# Patient Record
Sex: MALE | Race: WHITE
[De-identification: names, ages, dates, MRNs, and addresses within clinical notes are randomized per-mention and may not be internally consistent; named-entity substitution may affect disease eponyms.]

---

## 2017-10-13 ENCOUNTER — HOSPITAL ENCOUNTER (EMERGENCY)
Dept: HOSPITAL 75 - ER | Age: 62
Discharge: HOME | End: 2017-10-13
Payer: MEDICAID

## 2017-10-13 VITALS — WEIGHT: 170 LBS | HEIGHT: 66 IN | BODY MASS INDEX: 27.32 KG/M2

## 2017-10-13 VITALS — SYSTOLIC BLOOD PRESSURE: 163 MMHG | DIASTOLIC BLOOD PRESSURE: 88 MMHG

## 2017-10-13 DIAGNOSIS — E86.0: ICD-10-CM

## 2017-10-13 DIAGNOSIS — I10: ICD-10-CM

## 2017-10-13 DIAGNOSIS — Z80.0: ICD-10-CM

## 2017-10-13 DIAGNOSIS — R11.10: ICD-10-CM

## 2017-10-13 DIAGNOSIS — Z87.820: ICD-10-CM

## 2017-10-13 DIAGNOSIS — Z90.89: ICD-10-CM

## 2017-10-13 DIAGNOSIS — K56.41: Primary | ICD-10-CM

## 2017-10-13 LAB
ALBUMIN SERPL-MCNC: 4.2 GM/DL (ref 3.2–4.5)
ALT SERPL-CCNC: 76 U/L (ref 0–55)
ANION GAP SERPL CALC-SCNC: 14 MMOL/L (ref 5–14)
ANISOCYTOSIS BLD QL SMEAR: SLIGHT
APTT BLD: 29 SEC (ref 24–35)
AST SERPL-CCNC: 48 U/L (ref 5–34)
BASOPHILS # BLD AUTO: 0.1 10^3/UL (ref 0–0.1)
BASOPHILS NFR BLD AUTO: 1 % (ref 0–10)
BASOPHILS NFR BLD MANUAL: 0 %
BILIRUB SERPL-MCNC: 0.6 MG/DL (ref 0.1–1)
BILIRUB UR QL STRIP: NEGATIVE
BUN SERPL-MCNC: 16 MG/DL (ref 7–18)
BUN/CREAT SERPL: 21
CALCIUM SERPL-MCNC: 9.7 MG/DL (ref 8.5–10.1)
CHLORIDE SERPL-SCNC: 101 MMOL/L (ref 98–107)
CO2 SERPL-SCNC: 20 MMOL/L (ref 21–32)
CREAT SERPL-MCNC: 0.76 MG/DL (ref 0.6–1.3)
EOSINOPHIL # BLD AUTO: 0.1 10^3/UL (ref 0–0.3)
EOSINOPHIL NFR BLD AUTO: 1 % (ref 0–10)
EOSINOPHIL NFR BLD MANUAL: 0 %
ERYTHROCYTE [DISTWIDTH] IN BLOOD BY AUTOMATED COUNT: 14.3 % (ref 10–14.5)
GFR SERPLBLD BASED ON 1.73 SQ M-ARVRAT: > 60 ML/MIN
GLUCOSE SERPL-MCNC: 123 MG/DL (ref 70–105)
INR PPP: 1 (ref 0.8–1.4)
KETONES UR QL STRIP: (no result)
LEUKOCYTE ESTERASE UR QL STRIP: NEGATIVE
LYMPHOCYTES # BLD AUTO: 2.2 X 10^3 (ref 1–4)
LYMPHOCYTES NFR BLD AUTO: 14 % (ref 12–44)
MCH RBC QN AUTO: 30 PG (ref 25–34)
MCHC RBC AUTO-ENTMCNC: 34 G/DL (ref 32–36)
MCV RBC AUTO: 90 FL (ref 80–99)
MONOCYTES # BLD AUTO: 1.4 X 10^3 (ref 0–1)
MONOCYTES NFR BLD AUTO: 9 % (ref 0–12)
NEUTROPHILS # BLD AUTO: 11.8 X 10^3 (ref 1.8–7.8)
NEUTROPHILS NFR BLD AUTO: 76 % (ref 42–75)
NEUTS BAND NFR BLD MANUAL: 74 %
NEUTS BAND NFR BLD: 0 %
NITRITE UR QL STRIP: NEGATIVE
PH UR STRIP: 6 [PH] (ref 5–9)
PLATELET # BLD: 383 10^3/UL (ref 130–400)
PMV BLD AUTO: 9.9 FL (ref 7.4–10.4)
POTASSIUM SERPL-SCNC: 4 MMOL/L (ref 3.6–5)
PROT SERPL-MCNC: 8.5 GM/DL (ref 6.4–8.2)
PROT UR QL STRIP: (no result)
PROTHROMBIN TIME: 13.5 SEC (ref 12.2–14.7)
RBC # BLD AUTO: 4.86 10^6/UL (ref 4.35–5.85)
SODIUM SERPL-SCNC: 135 MMOL/L (ref 135–145)
SP GR UR STRIP: 1.02 (ref 1.02–1.02)
SQUAMOUS #/AREA URNS HPF: (no result) /HPF
UROBILINOGEN UR-MCNC: NORMAL MG/DL
VARIANT LYMPHS NFR BLD MANUAL: 17 %
VARIANT LYMPHS NFR BLD MANUAL: 2 %
WBC # BLD AUTO: 15.6 10^3/UL (ref 4.3–11)
WBC #/AREA URNS HPF: (no result) /HPF

## 2017-10-13 PROCEDURE — 85027 COMPLETE CBC AUTOMATED: CPT

## 2017-10-13 PROCEDURE — 74176 CT ABD & PELVIS W/O CONTRAST: CPT

## 2017-10-13 PROCEDURE — 85610 PROTHROMBIN TIME: CPT

## 2017-10-13 PROCEDURE — 86920 COMPATIBILITY TEST SPIN: CPT

## 2017-10-13 PROCEDURE — 85730 THROMBOPLASTIN TIME PARTIAL: CPT

## 2017-10-13 PROCEDURE — 87040 BLOOD CULTURE FOR BACTERIA: CPT

## 2017-10-13 PROCEDURE — 71010: CPT

## 2017-10-13 PROCEDURE — 85007 BL SMEAR W/DIFF WBC COUNT: CPT

## 2017-10-13 PROCEDURE — 83605 ASSAY OF LACTIC ACID: CPT

## 2017-10-13 PROCEDURE — 36415 COLL VENOUS BLD VENIPUNCTURE: CPT

## 2017-10-13 PROCEDURE — 86900 BLOOD TYPING SEROLOGIC ABO: CPT

## 2017-10-13 PROCEDURE — 86901 BLOOD TYPING SEROLOGIC RH(D): CPT

## 2017-10-13 PROCEDURE — 80053 COMPREHEN METABOLIC PANEL: CPT

## 2017-10-13 PROCEDURE — 87804 INFLUENZA ASSAY W/OPTIC: CPT

## 2017-10-13 PROCEDURE — 81000 URINALYSIS NONAUTO W/SCOPE: CPT

## 2017-10-13 PROCEDURE — 94640 AIRWAY INHALATION TREATMENT: CPT

## 2017-10-13 PROCEDURE — 86850 RBC ANTIBODY SCREEN: CPT

## 2017-10-13 PROCEDURE — 86141 C-REACTIVE PROTEIN HS: CPT

## 2017-10-13 NOTE — ED GENERAL
General


Chief Complaint:  Fever-Adult/Adol


Stated Complaint:  UPPER GI BLEED,TEMP,VOMITING


Source of Information:  Patient, EMS, Nursing Home Records


Exam Limitations:  No Limitations


 (DAISY QUINTANA MD)





History of Present Illness


Time Seen by Provider:  03:21


Initial Comments


This 61-year-old resident of [facility] in [place], [place] presents to the 

emergency room via EMS with multiple episodes of vomiting this evening.  

Vomiting started around 19:00 at which time he vomited his supper.  Subsequent 

episodes of vomiting had a coffee-ground appearance.  Nursing home did not have 

vital signs at the time of report.  Patient has stated abdominal pain to 

nursing home staff.  Nursing home staff reported decreased level of alertness.  

Patient has developmental disabilities and does not talk much at baseline.  

Patient has no measurable fever but EMS, nursing staff, and this provider all 

agree he feels febrile.


 (DAISY QUINTANA MD)





Allergies and Home Medications


Allergies


Coded Allergies:  


     No Known Drug Allergies (Unverified , 13)





Home Medications


Metoprolol Succinate 25 Mg Tab.er.24h, 25 MG PO DAILY, #30 Ref 1


   Prescribed by: LEROY VALLEJO on 8/26/15 1111


Polyethylene Glycol 3350 17 Gm Powd.pack, 17 GM PO DAILY, #30


   Prescribed by: LEROY VALLEJO on 8/31/15 1318


Tizanidine HCl 2 Mg Capsule, 2 MG PO DAILY, (Reported)





Constitutional:  see HPI


EENTM:  no symptoms reported


Respiratory:  no symptoms reported


Cardiovascular:  no symptoms reported


Gastrointestinal:  see HPI


Genitourinary:  no symptoms reported


Musculoskeletal:  no symptoms reported


Skin:  no symptoms reported


Psychiatric/Neurological:  See HPI, Emotional Problems


Immunological/Allergic:  no symptoms reported (DAISY QUINTANA MD)





Past Medical-Social-Family Hx


Patient Social History


Recent Foreign Travel:  No


Contact w/Someone Who Travel:  No


 (DAISY QUINTANA MD)





Immunizations Up To Date


Tetanus Booster (TDap):  Less than 5yrs


PED Vaccines UTD:  Yes


Date of Pneumonia Vaccine:  2013


Date of Influenza Vaccine:  2013


 (DAISY QUINTANA MD)





Seasonal Allergies


Seasonal Allergies:  No


 (DAISY QUINTANA MD)





Surgeries


History of Surgeries:  Yes


Surgeries:  Abdominal (left inguinal hernia with testicle removal), 

Tonsillectomy


 (DAISY QUINTANA MD)





Respiratory


History of Respiratory Disorde:  No


Currently Using CPAP:  No


Currently Using BIPAP:  No


 (DAISY QUINTANA MD)





Cardiovascular


History of Cardiac Disorders:  Yes (venous insufficiency)


Cardiac Disorders:  Hypertension, Peripheral Vascular


 (DAISY QUINTANA MD)





Neurological


History of Neurological Disord:  Yes


Neurological Disorders:  Developmental Disorder, TIA, Traumatic Brain Injury


 (DAISY QUINTANA MD)





Reproductive System


Hx Reproductive Disorders:  No


Sexually Transmitted Disease:  No


HIV/AIDS:  No


 (DAISY QUINTANA MD)





Genitourinary


Genitourinary Disorders:  UTI-Chronic


 (DAISY QUINTANA MD)





Gastrointestinal


History of Gastrointestinal Di:  Yes


Gastrointestinal Disorders:  Obstructive Bowel


 (DAISY QUINTANA MD)





Musculoskeletal


History of Musculoskeletal Dis:  Yes


Musculoskeletal Disorders:  Osteoporosis, Arthritis, Contracture


 (DAISY QUINTANA MD)





Endocrine


History of Endocrine Disorders:  No


 (DAISY QUINTANA MD)





HEENT


History of HEENT Disorders:  No


 (DAISY QUINTANA MD)





Cancer


History of Cancer:  No


 (DAISY QUINTANA MD)





Psychosocial


History of Psychiatric Problem:  No


Behavioral Health Disorders:  Eating Disorder


 (DAISY QUINTANA MD)





Integumentary


History of Skin or Integumenta:  Yes


Skin/Integumentary Disorders:  Recent Skin Changes


 (DAISY QUINTANA MD)





Blood Transfusions


Adverse Reaction to a Blood Tr:  No


 (DAISY QUINTANA MD)





Family Medical History


Significant Family History:  No Pertinent Family Hx


Family Medial History:  


Cancer


  09 SISTER (BREAST CANCER)


  MATERAL GRANDMOTHER (BREAST CANCER)


  GREAT AUNT (BREAST CANCER)


  MATERNAL UNCLE  (PANCREATIC CANCER)


  MATERNAL GRANDFATHER (TESTICULAR/PROSTATE CANCER)


Family history: Cardiovascular disease


  09 SISTER


  09 SISTER (SMALL HEART ATTACK, SMALL STROKE )


Family history: Diabetes mellitus


  09 SISTER


  03 MOTHER


  09 SISTER


Family history: Hypertension


  09 SISTER


  03 FATHER


  09 SISTER


  09 SISTER


Family history: Thyroid disorder


  09 SISTER


History of - respiratory disease


  09 SISTER (COPD)


  09 SISTER (COPD/ASTHMA)


  09 SISTER (COPD /ASTHMA)


Myocardial infarction


Prostate cancer





No Family History of:


  Abdominal aortic aneurysm


  Oscoda's disease


  Alcoholism


  Aphasia


  Cancer of colon


  Cataract


  Chest pain


  Congenital heart disease


  Congestive heart failure


  Cystic fibrosis


  Dementia


  Dysphagia


  Family history: Allergy


  Family history: Alzheimer's disease


  Family history: Arthritis


  Family history: Asthma


  Family history: Breast disease


  Family history: Coronary thrombosis


  Family history: Gastrointestinal disease


  Family history: Glaucoma


  Family history: Osteoporosis


  Headache


  Hearing loss


  Heart disease


  Hereditary disease


  History of - anemia


  History of - disorder


  History of drug abuse


  Human immunodeficiency virus (HIV) seropositivity


  Hypercholesterolemia


  Infertile


  Kidney disease


  Malignant neoplasm of lung


  Parkinson's disease


  Psychotic disorder


  Seizure disorder


  Stroke


  Tuberculosis


  Visual impairment (DAISY QUINTANA MD)


Family Medial History:  


Cancer


  09 SISTER (BREAST CANCER)


  MATERAL GRANDMOTHER (BREAST CANCER)


  GREAT AUNT (BREAST CANCER)


  MATERNAL UNCLE  (PANCREATIC CANCER)


  MATERNAL GRANDFATHER (TESTICULAR/PROSTATE CANCER)


Family history: Cardiovascular disease


  09 SISTER


  09 SISTER (SMALL HEART ATTACK, SMALL STROKE )


Family history: Diabetes mellitus


  09 SISTER


  03 MOTHER


  09 SISTER


Family history: Hypertension


  09 SISTER


  03 FATHER


  09 SISTER


  09 SISTER


Family history: Thyroid disorder


  09 SISTER


History of - respiratory disease


  09 SISTER (COPD)


  09 SISTER (COPD/ASTHMA)


  09 SISTER (COPD /ASTHMA)


Myocardial infarction


Prostate cancer





No Family History of:


  Abdominal aortic aneurysm


  Oscoda's disease


  Alcoholism


  Aphasia


  Cancer of colon


  Cataract


  Chest pain


  Congenital heart disease


  Congestive heart failure


  Cystic fibrosis


  Dementia


  Dysphagia


  Family history: Allergy


  Family history: Alzheimer's disease


  Family history: Arthritis


  Family history: Asthma


  Family history: Breast disease


  Family history: Coronary thrombosis


  Family history: Gastrointestinal disease


  Family history: Glaucoma


  Family history: Osteoporosis


  Headache


  Hearing loss


  Heart disease


  Hereditary disease


  History of - anemia


  History of - disorder


  History of drug abuse


  Human immunodeficiency virus (HIV) seropositivity


  Hypercholesterolemia


  Infertile


  Kidney disease


  Malignant neoplasm of lung


  Parkinson's disease


  Psychotic disorder


  Seizure disorder


  Stroke


  Tuberculosis


  Visual impairment (BEBO CALLAHAN MD)





Physical Exam


Vital Signs





Vital Sign - Last 12Hours








 10/13/17 10/13/17





 03:25 06:06


 


Temp 99.4 


 


Pulse 126 


 


Resp 20 


 


B/P (MAP) 83/30 


 


Pulse Ox 94 


 


O2 Delivery  Room Air





 (BEBO CALLAHAN MD)


Vital Signs


Capillary Refill :  


 (DAISY QUINTANA MD)


General Appearance:  No Apparent Distress, WD/WN


HEENT:  PERRL/EOMI, Normal ENT Inspection


Neck:  Normal Inspection


Respiratory:  Lungs Clear, Normal Breath Sounds, No Accessory Muscle Use, No 

Respiratory Distress


Cardiovascular:  Regular Rate, Rhythm, No Edema, No Murmur


Gastrointestinal:  Normal Bowel Sounds, Non Tender, Soft


Extremity:  Other (contractures of the upper extremities.  Lower extremities in 

soft boots)


Neurologic/Psychiatric:  Alert, CNs II-XII Norm as Tested, Motor Weakness (

chronic and unchanged)


Skin:  Normal Color, Warm/Dry (DAISY QUINTANA MD)





Focused Exam


Evaluation


Lactate Level


Laboratory Tests


10/13/17 03:42: Lactic Acid Level 1.02


 (BEBO CALLAHAN MD)





Progress/Results/Core Measures


Results/Orders


Lab Results





Laboratory Tests








Test


  10/13/17


03:42 10/13/17


05:42 Range/Units


 


 


White Blood Count


  15.6 H


  


  4.3-11.0


10^3/uL


 


Red Blood Count


  4.86 


  


  4.35-5.85


10^6/uL


 


Hemoglobin 14.7   13.3-17.7  G/DL


 


Hematocrit 44   40-54  %


 


Mean Corpuscular Volume 90   80-99  FL


 


Mean Corpuscular Hemoglobin 30   25-34  PG


 


Mean Corpuscular Hemoglobin


Concent 34 


  


  32-36  G/DL


 


 


Red Cell Distribution Width 14.3   10.0-14.5  %


 


Platelet Count


  383 


  


  130-400


10^3/uL


 


Mean Platelet Volume 9.9   7.4-10.4  FL


 


Neutrophils (%) (Auto) 76 H  42-75  %


 


Lymphocytes (%) (Auto) 14   12-44  %


 


Monocytes (%) (Auto) 9   0-12  %


 


Eosinophils (%) (Auto) 1   0-10  %


 


Basophils (%) (Auto) 1   0-10  %


 


Neutrophils # (Auto) 11.8 H  1.8-7.8  X 10^3


 


Lymphocytes # (Auto) 2.2   1.0-4.0  X 10^3


 


Monocytes # (Auto) 1.4 H  0.0-1.0  X 10^3


 


Eosinophils # (Auto)


  0.1 


  


  0.0-0.3


10^3/uL


 


Basophils # (Auto)


  0.1 


  


  0.0-0.1


10^3/uL


 


Neutrophils % (Manual) 74    %


 


Lymphocytes % (Manual) 17    %


 


Monocytes % (Manual) 7    %


 


Eosinophils % (Manual) 0    %


 


Basophils % (Manual) 0    %


 


Band Neutrophils 0    %


 


Reactive Lymphocytes 2    %


 


Anisocytosis SLIGHT    


 


Prothrombin Time 13.5   12.2-14.7  SEC


 


INR Comment 1.0   0.8-1.4  


 


Activated Partial


Thromboplast Time 29 


  


  24-35  SEC


 


 


Sodium Level 135   135-145  MMOL/L


 


Potassium Level 4.0   3.6-5.0  MMOL/L


 


Chloride Level 101     MMOL/L


 


Carbon Dioxide Level 20 L  21-32  MMOL/L


 


Anion Gap 14   5-14  MMOL/L


 


Blood Urea Nitrogen 16   7-18  MG/DL


 


Creatinine


  0.76 


  


  0.60-1.30


MG/DL


 


Estimat Glomerular Filtration


Rate > 60 


  


   


 


 


BUN/Creatinine Ratio 21    


 


Glucose Level 123 H    MG/DL


 


Lactic Acid Level


  1.02 


  


  0.50-2.00


MMOL/L


 


Calcium Level 9.7   8.5-10.1  MG/DL


 


Total Bilirubin 0.6   0.1-1.0  MG/DL


 


Aspartate Amino Transf


(AST/SGOT) 48 H


  


  5-34  U/L


 


 


Alanine Aminotransferase


(ALT/SGPT) 76 H


  


  0-55  U/L


 


 


Alkaline Phosphatase 162 H    U/L


 


C-Reactive Protein High


Sensitivity 1.09 H


  


  0.00-0.50


MG/DL


 


Total Protein 8.5 H  6.4-8.2  GM/DL


 


Albumin 4.2   3.2-4.5  GM/DL


 


Urine Color  YELLOW   


 


Urine Clarity  CLEAR   


 


Urine pH  6  5-9  


 


Urine Specific Gravity  1.020  1.016-1.022  


 


Urine Protein  1+ H NEGATIVE  


 


Urine Glucose (UA)  NEGATIVE  NEGATIVE  


 


Urine Ketones  3+ H NEGATIVE  


 


Urine Nitrite  NEGATIVE  NEGATIVE  


 


Urine Bilirubin  NEGATIVE  NEGATIVE  


 


Urine Urobilinogen  NORMAL  NORMAL  MG/DL


 


Urine Leukocyte Esterase  NEGATIVE  NEGATIVE  


 


Urine RBC (Auto)  1+ H NEGATIVE  


 


Urine RBC  0-2   /HPF


 


Urine WBC  NONE   /HPF


 


Urine Squamous Epithelial


Cells 


  RARE 


   /HPF


 


 


Urine Crystals  NONE   /LPF


 


Urine Bacteria  NEGATIVE   /HPF


 


Urine Casts  NONE   /LPF


 


Urine Mucus  SMALL H  /LPF


 


Urine Culture Indicated  NO   





 (BEBO CALLAHAN MD)


Micro Results





Microbiology


10/13/17 Influenza Types A,B Antigen (FABIOLA) - Final, Complete


           


 (BEBO CALLAHAN MD)


My Orders





Orders - BEBO CALLAHAN MD


Lidocaine 2% (Urojet) (Xylocaine Urojet) (10/13/17 07:30)


Bisacodyl Suppository (Dulcolax Supposit (10/13/17 07:49)


 (BEBO CALLAHAN MD)


Medications Given in ED





Current Medications








 Medications  Dose


 Ordered  Sig/Andrea


 Route  Start Time


 Stop Time Status Last Admin


Dose Admin


 


 Albuterol/


 Ipratropium  3 ml  ONCE  ONCE


 INH  10/13/17 05:00


 10/13/17 05:01 DC 10/13/17 06:06


3 ML


 


 Famotidine  20 mg  ONCE  ONCE


 IVP  10/13/17 03:45


 10/13/17 03:46 DC 10/13/17 04:04


20 MG


 


 Lidocaine HCl  10 ml  ONCE  ONCE


 TOP  10/13/17 07:30


 10/13/17 07:31 DC 10/13/17 07:35


10 ML


 


 Ondansetron HCl  8 mg  ONCE  ONCE


 IVP  10/13/17 03:45


 10/13/17 03:46 DC 10/13/17 04:04


8 MG


 


 Pantoprazole  80 mg  ONCE  ONCE


 IV  10/13/17 03:45


 10/13/17 03:46 DC 10/13/17 04:09


80 MG


 


 Sodium Chloride  1,000 ml @ 


 0 mls/hr  Q0M ONCE


 IV  10/13/17 03:41


 10/13/17 03:44 DC 10/13/17 04:01


0 MLS/HR


 


 Sodium Chloride  1,000 ml @ 


 0 mls/hr  Q0M ONCE


 IV  10/13/17 06:10


 10/13/17 06:11 DC 10/13/17 06:56


0 MLS/HR





 (BEBO CALLAHAN MD)


Vital Signs/I&O





Vital Sign - Last 12Hours








 10/13/17 10/13/17





 03:25 06:06


 


Temp 99.4 


 


Pulse 126 


 


Resp 20 


 


B/P (MAP) 83/30 


 


Pulse Ox 94 96


 


O2 Delivery  Room Air





 (BEBO CALLAHAN MD)


Progress Note :  


   Time:  05:43


Progress Note


Heart rate and blood pressure have improved after a liter of normal saline.  

Patient received Pepcid, Zofran and Protonix.  There his been no further 

vomiting.  Patient denies pain.  Patient has leukocytosis but no source of 

infection has been identified.  UA was just obtained when Ugarte catheter was 

placed.  If UA is negative, CT of the abdomen will be performed.  Patient 

appeared to have some forced expirations.  A DuoNeb treatment has been ordered.


 (DAISY QUINTANA MD)


Progress Note :  


Progress Note


0630: Assumed care of patient pending CT scan of the abdomen and pelvis.  

Patient has hypertension now and not hypotension.  Also heart rate noted to be 

in the one teens.  Monitor patient.  0730: CT results noted.  Patient has fecal 

impaction which may be causing the hypertension and tachycardia due to pain.  

Patient does not admit to pain but has significant disability which may be 

causing the difficulty with communication.  Reexamination of the patient shows 

lung sounds clear with tachycardia.  Abdomen is nontender overall on exam 

although appears to be tight.  This would correlate with the moderate bowel gas 

noted on CT scan.  Patient does have soft foam boots to bilateral lower 

extremities.  I did discuss the case with on-call for [facility].  

Patient will need a digital rectal disimpaction and then bowel regimen at the 

nursing home.  No other significant findings to indicate the need for admission 

at this time.  Urojet instilled into the rectum to help with pain on digital 

rectal disimpaction.  Monitor patient.





0805: Digital rectal disimpaction and a small bowel stool.  Patient had 

multiple soft stool within the rectum which would indicate that patient is not 

fully impacted.  I Did place Dulcolax suppositories 2.  I did discuss the case 

with Dr. Dozier.  We will add MiraLAX 1 capful daily to his normal regimen.  

After rectal stimulation and placement of Dulcolax, patient did have a large 

bowel movement.  Discharged to nursing home with return precautions.  Dr. Dozier 

and staff will call nursing home for further orders as needed.


 (BEBO CALLAHAN MD)





Diagnostic Imaging





   Diagonstic Imaging:  Xray


   Plain Films/CT/US/NM/MRI:  chest


Comments


Chest x-ray viewed by me and compared with prior.  There are questionable 

increased perihilar markings on the right.  No other acute changes.


 (DAISY QUINTANA MD)





   Diagonstic Imaging:  CT


   Plain Films/CT/US/NM/MRI:  abdomen, pelvis


Comments


 V[facility].


 [place], [place]





NAME:   CAROLHANK DENTON


MED REC#:   [id number]


ACCOUNT#:   [id number]


PT STATUS:   REG ER


:   1955


PHYSICIAN:   DAISY QUINTANA MD


ADMIT DATE:   10/13/17/ER


 ***Draft***


Date of Exam:10/13/17





CT ABDOMEN/PELVIS WO








PROCEDURE: CT abdomen and pelvis without contrast.





TECHNIQUE: Multiple contiguous axial images were obtained through


the abdomen and pelvis without the use of intravenous contrast. 





INDICATION: Abdominal pain. 





COMPARISON: CT abdomen pelvis without contrast 2015.





FINDINGS: Examination limited by motion artifact. Mild


atelectasis or infiltrate in the lung bases. The liver,


gallbladder, pancreas, spleen, adrenals, kidneys, collecting


systems and appendix are unremarkable on this non-contrast exam.


Ugarte catheter. Marked distention of the rectum which is filled


with stool. The colon is otherwise moderately gaseously


distended. Right inguinal hernia containing a normal-appearing


loop of small bowel. Mild scattered atherosclerotic


calcifications including a normal caliber abdominal aorta. No


evidence of bowel obstruction. No free intraperitoneal air or


fluid. No lymphadenopathy. Moderate spondylotic changes in the


visualized spine. No acute osseous findings. Chronic left rib


fractures.





IMPRESSION: 


1. Distended rectum which is filled with stool suspicious for


fecal impaction. The colon is otherwise moderately gaseously


distended.


2. Persistent right inguinal hernia containing a normal-appearing


loop of small bowel.





  Dictated on workstation # [id number]








Dict:   10/13/17 0658


Trans:   10/13/17 0705


[facility] [id number]





Interpreted by:     JANNETH ALVAREZ MD


Electronically signed by:  


 (BEBO CALLAHAN MD)





Departure


Impression


Impression:  


 Primary Impression:  


 Fecal impaction in rectum


 Additional Impressions:  


 Vomiting


 Qualified Codes:  R11.14 - Bilious vomiting


 Dehydration


Disposition:  01 HOME, SELF-CARE


Condition:  Stable





Departure-Patient Inst.


Decision time for Depature:  08:07


 (BEBO CALLAHAN MD)


Referrals:  


HANK NOLASCO MD (PCP/Family)


Primary Care Physician


Patient Instructions:  Dehydration, Adult (DC), Fecal Impaction (DC), Nausea 

and Vomiting, Adult (DC)





Add. Discharge Instructions:  


All discharge instructions reviewed with patient and/or family. Voiced 

understanding.





Continue medications as directed.  Add MiraLAX 1 capful (17 g) by mouth daily 

in juice or water.  Follow-up with primary care physician within one week for 

recheck and further evaluation.  Return for worse pain, fever, vomiting, 

weakness, breathing problems or other concerns as needed.  Encourage plenty of 

fluids.











DAISY QUINTANA MD Oct 13, 2017 04:35


BEBO CALLAHAN MD Oct 13, 2017 07:49

## 2017-10-13 NOTE — DIAGNOSTIC IMAGING REPORT
PROCEDURE: CT abdomen and pelvis without contrast.



TECHNIQUE: Multiple contiguous axial images were obtained through

the abdomen and pelvis without the use of intravenous contrast. 



INDICATION: Abdominal pain. 



COMPARISON: CT abdomen pelvis without contrast 08/30/2015.



FINDINGS: Examination limited by motion artifact. Mild

atelectasis or infiltrate in the lung bases. The liver,

gallbladder, pancreas, spleen, adrenals, kidneys, collecting

systems and appendix are unremarkable on this non-contrast exam.

Ugarte catheter. Marked distention of the rectum which is filled

with stool. The colon is otherwise moderately gaseously

distended. Right inguinal hernia containing a normal-appearing

loop of small bowel. Mild scattered atherosclerotic

calcifications including a normal caliber abdominal aorta. No

evidence of bowel obstruction. No free intraperitoneal air or

fluid. No lymphadenopathy. Moderate spondylotic changes in the

visualized spine. No acute osseous findings. Chronic left rib

fractures.



IMPRESSION: 

1. Distended rectum which is filled with stool suspicious for

fecal impaction. The colon is otherwise moderately gaseously

distended.

2. Persistent right inguinal hernia containing a normal-appearing

loop of small bowel.



Dictated by: 



  Dictated on workstation # [id number]

## 2017-10-13 NOTE — DIAGNOSTIC IMAGING REPORT
EXAM: CHEST 1 VIEW, AP/PA ONLY



INDICATION: Fever.



COMPARISON: Chest radiograph 12/8/2013.



FINDINGS: Low lung volumes with perihilar atelectasis. Normal

heart size and pulmonary vascularity. No dense consolidation,

pleural effusion or pneumothorax. No acute osseous findings.



IMPRESSION: Low lung volumes with perihilar atelectasis. No dense

consolidation.



Dictated by: 



  Dictated on workstation # [id number]

## 2019-01-23 ENCOUNTER — HOSPITAL ENCOUNTER (OUTPATIENT)
Dept: HOSPITAL 75 - 4TH | Age: 64
Setting detail: OBSERVATION
LOS: 2 days | Discharge: ANOTHER HOSPITAL | End: 2019-01-25
Payer: MEDICAID

## 2019-02-02 ENCOUNTER — HOSPITAL ENCOUNTER (EMERGENCY)
Dept: HOSPITAL 75 - ER | Age: 64
Discharge: HOME | End: 2019-02-02
Payer: MEDICAID

## 2019-02-02 VITALS — DIASTOLIC BLOOD PRESSURE: 95 MMHG | SYSTOLIC BLOOD PRESSURE: 120 MMHG

## 2019-02-02 VITALS — BODY MASS INDEX: 34.57 KG/M2 | WEIGHT: 210 LBS | HEIGHT: 65.5 IN

## 2019-02-02 DIAGNOSIS — R04.0: Primary | ICD-10-CM

## 2019-02-02 DIAGNOSIS — Z80.0: ICD-10-CM

## 2019-02-02 DIAGNOSIS — Z87.820: ICD-10-CM

## 2019-02-02 DIAGNOSIS — Z90.89: ICD-10-CM

## 2019-02-02 DIAGNOSIS — Z79.51: ICD-10-CM

## 2019-02-02 DIAGNOSIS — Z86.73: ICD-10-CM

## 2019-02-02 DIAGNOSIS — Z87.440: ICD-10-CM

## 2019-02-02 DIAGNOSIS — I10: ICD-10-CM

## 2019-02-02 DIAGNOSIS — I73.9: ICD-10-CM

## 2019-02-02 DIAGNOSIS — Z82.49: ICD-10-CM

## 2019-02-02 DIAGNOSIS — M81.0: ICD-10-CM

## 2019-02-02 DIAGNOSIS — Z87.19: ICD-10-CM

## 2019-02-02 DIAGNOSIS — Z80.42: ICD-10-CM

## 2019-02-02 DIAGNOSIS — Z80.3: ICD-10-CM

## 2019-02-02 DIAGNOSIS — K58.9: ICD-10-CM

## 2019-02-02 LAB
ALBUMIN SERPL-MCNC: 4 GM/DL (ref 3.2–4.5)
ALP SERPL-CCNC: 144 U/L (ref 40–136)
ALT SERPL-CCNC: 23 U/L (ref 0–55)
APTT BLD: 28 SEC (ref 24–35)
BILIRUB SERPL-MCNC: 0.3 MG/DL (ref 0.1–1)
BUN/CREAT SERPL: 27
CALCIUM SERPL-MCNC: 9.1 MG/DL (ref 8.5–10.1)
CHLORIDE SERPL-SCNC: 103 MMOL/L (ref 98–107)
CO2 SERPL-SCNC: 21 MMOL/L (ref 21–32)
CREAT SERPL-MCNC: 0.7 MG/DL (ref 0.6–1.3)
ERYTHROCYTE [DISTWIDTH] IN BLOOD BY AUTOMATED COUNT: 13.8 % (ref 10–14.5)
GFR SERPLBLD BASED ON 1.73 SQ M-ARVRAT: > 60 ML/MIN
GLUCOSE SERPL-MCNC: 127 MG/DL (ref 70–105)
HCT VFR BLD CALC: 40 % (ref 40–54)
HGB BLD-MCNC: 13.3 G/DL (ref 13.3–17.7)
INR PPP: 1 (ref 0.8–1.4)
MCH RBC QN AUTO: 30 PG (ref 25–34)
MCHC RBC AUTO-ENTMCNC: 33 G/DL (ref 32–36)
MCV RBC AUTO: 91 FL (ref 80–99)
PLATELET # BLD: 361 10^3/UL (ref 130–400)
PMV BLD AUTO: 9.8 FL (ref 7.4–10.4)
POTASSIUM SERPL-SCNC: 4.2 MMOL/L (ref 3.6–5)
PROT SERPL-MCNC: 7.7 GM/DL (ref 6.4–8.2)
PROTHROMBIN TIME: 13.5 SEC (ref 12.2–14.7)
SODIUM SERPL-SCNC: 135 MMOL/L (ref 135–145)
WBC # BLD AUTO: 15 10^3/UL (ref 4.3–11)

## 2019-02-02 PROCEDURE — 36415 COLL VENOUS BLD VENIPUNCTURE: CPT

## 2019-02-02 PROCEDURE — 99284 EMERGENCY DEPT VISIT MOD MDM: CPT

## 2019-02-02 PROCEDURE — 85730 THROMBOPLASTIN TIME PARTIAL: CPT

## 2019-02-02 PROCEDURE — 85610 PROTHROMBIN TIME: CPT

## 2019-02-02 PROCEDURE — 85027 COMPLETE CBC AUTOMATED: CPT

## 2019-02-02 PROCEDURE — 80053 COMPREHEN METABOLIC PANEL: CPT

## 2019-02-02 NOTE — XMS REPORT
Continuity of Care Document

 Created on: 2019



HANK ETIENNE

External Reference #: [id number]

: 1955

Sex: Male



Demographics







 Address  [address]  [address]

 

 Home Phone  [phone] x

 

 Preferred Language  Unknown

 

 Marital Status  Unknown

 

 Confucianist Affiliation  Unknown

 

 Race  Unknown

 

 Ethnic Group  Unknown





Author







 Author  [facility]

 

 Address  Unknown

 

 Phone  Unavailable



              



Allergies

      





 Active            Description            Code            Type            
Severity            Reaction            Onset            Reported/Identified   
         Relationship to Patient            Clinical Status        

 

 Yes            No Known Drug Allergies            [id number]            Drug 
Allergy            Unknown            N/A                         2013   
                               



                  



Medications

      



There is no data.                  



Problems

      





 Date Dx Coded            Attending            Type            Code            
Diagnosis            Diagnosed By        

 

 2013            JONATHAN NORMAN MD            Ot            459.81     
       VENOUS INSUFFICIENCY NOS                     

 

 2013            JONATHAN NORMAN MD            Ot            682.6      
      CELLULITIS OF LEG                     

 

 2013            JONATHAN NORMAN MD            Ot            707.09     
       PRESSURE ULCER, OTHER SITE                     

 

 2013            JONATHAN NORMAN MD            Ot            707.23     
       PRESSURE ULCER, STAGE III                     

 

 2013            JONATHAN NORMAN MD            Ot            716.90     
       ARTHROPATHY NOS-UNSPEC                     

 

 2013            JONATHAN NORMAN MD            Ot            733.00     
       OSTEOPOROSIS NOS                     

 

 2013            JONATHAN NORMAN MD            Ot            V04.81     
       ND FOR PROPHYLACTIC VACCIN AND INOCULATI                     

 

 2013            JONATHAN NORMAN MD            Ot            V12.54     
       PERSONAL HX OF TIA,  CEREBRAL INFARCTION                     

 

 2013            EDWARD JESUS DO                         315.9          
  LEARNING/DELAY IN DEVELOPMENT                     

 

 2013            EDWARD JESUS DO                         401.1          
  HYPERTENSION, BENIGN ESSENTIAL                     

 

 2013            EDWARD JESUS DO                         682.6          
  CELLULITIS AND ABSCESS OF LEG EXCEPT FOOT                     

 

 2013            EDWARD JESUS DO                         315.9          
  LEARNING/DELAY IN DEVELOPMENT                     

 

 2013            EDWARD JESUS DO                         401.1          
  HYPERTENSION, BENIGN ESSENTIAL                     

 

 2013            EDWARD JESUS DO                         682.6          
  CELLULITIS AND ABSCESS OF LEG EXCEPT FOOT                     

 

 2013            JANEL GASPAR                         315.9    
        LEARNING/DELAY IN DEVELOPMENT                     

 

 2013            JANEL GASPAR                         401.1    
        HYPERTENSION, BENIGN ESSENTIAL                     

 

 2013            CLARK APRN, JANEL R                         682.6    
        CELLULITIS AND ABSCESS OF LEG EXCEPT FOOT                     

 

 2013            JESUS DO EDWARD K                         315.9          
  LEARNING/DELAY IN DEVELOPMENT                     

 

 2013            JESUS DO EDWARD K                         401.1          
  HYPERTENSION, BENIGN ESSENTIAL                     

 

 2013            JESUS DO, EDWARD K                         682.6          
  CELLULITIS AND ABSCESS OF LEG EXCEPT FOOT                     

 

 2013            JESUS DO, EDWARD K                         315.9          
  LEARNING/DELAY IN DEVELOPMENT                     

 

 2013            JESUS DO EDWARD K                         401.1          
  HYPERTENSION, BENIGN ESSENTIAL                     

 

 2013            JESUS DO EDWARD K                         682.6          
  CELLULITIS AND ABSCESS OF LEG EXCEPT FOOT                     

 

 2013            JANEL GASPAR R                         315.9    
        LEARNING/DELAY IN DEVELOPMENT                     

 

 2013            JANEL GASPAR R                         401.1    
        HYPERTENSION, BENIGN ESSENTIAL                     

 

 2013            JANEL GASPAR                         682.6    
        CELLULITIS AND ABSCESS OF LEG EXCEPT FOOT                     

 

 2013            ANN MARIE ARELLANO EDWARD K            Ot            041.19        
    BACTERIAL INFECTION DUE TO OTHER STAPHYL                     

 

 2013            MINISTERIO JESUS DOA K            Ot            041.49        
    OTHER AND UNSPECIFIED ESCHERICHIA COLI [                     

 

 2013            ANN MARIE ARELLANO EDWARD K            Ot            112.3         
   CUTANEOUS CANDIDIASIS                     

 

 2013            ANN MARIE ARELLANO EDWARD K            Ot            276.1         
   HYPOSMOLALITY                     

 

 2013            ANN MARIE ARELLANO EDWARD K            Ot            315.2         
   OTH LEARNING DIFFICULTY                     

 

 2013            ANN MARIE ARELLANO EDWARD K            Ot            401.9         
   HYPERTENSION NOS                     

 

 2013            ANN MARIE ARELLANO EDWARD K            Ot            438.89        
    OTH LATE EFFECT-CEREBROVASCULAR DISEASE                     

 

 2013            ANN MARIE ARELLANO EDWARD K            Ot            599.0         
   URIN TRACT INFECTION NOS                     

 

 2013            MINISTERIO JESUS DOA K            Ot            682.6         
   CELLULITIS OF LEG                     

 

 2013            ANN MARIE ARELLANO EDWARD K            Ot            728.87        
    MUSCLE WEAKNESS (GENERALIZED)                     

 

 2013            ANN MARIE ARELLANO EDWARD K            Ot            780.79        
    OTH MALAISE FATIGUE                     

 

 2013            ANN MARIE ARELLANO EDWARD K            Ot            908.9         
   LATE EFFECT INJURY NOS                     

 

 2013            ANN MARIE ARELLANO EDWARD K            Ot            E929.9        
    LATE EFF ACCIDENT NOS                     

 

 2013            ANN MARIE ARELLANO EDWARD K            Ot            V15.88        
    HISTORY OF FALL                     

 

 2013            ANN MARIE ARELLANO EDWARD K                         276.51         
   DEHYDRATION                     

 

 2013            ANN MARIE ARELLANO EDWARD K                         599.0          
  URINARY TRACT INFECTION                     

 

 2013            AMBER SOLISJANEL TUCKER R                         276.51   
         DEHYDRATION                     

 

 2013            CLARK ELIZABETH, JANEL R                         599.0    
        URINARY TRACT INFECTION                     

 

 2013            JESUS DO, EDWARD K                         276.51         
   DEHYDRATION                     

 

 2013            JESUS DO, EDWARD K                         599.0          
  URINARY TRACT INFECTION                     

 

 2013            JESUS DO, EDWARD K                         276.51         
   DEHYDRATION                     

 

 2013            JESUS DO, EDWARD K                         599.0          
  URINARY TRACT INFECTION                     

 

 2013            AMBER SOLISJANEL TUCKER R                         276.51   
         DEHYDRATION                     

 

 2013            AMBER JOHNSON, JANEL R                         599.0    
        URINARY TRACT INFECTION                     

 

 2014            AMBER SOLISJANEL TUCKER R                         596.51   
         OVERACTIVE BLADDER                     

 

 2014            JESUS DO, EDWARD K                         596.51         
   OVERACTIVE BLADDER                     

 

 2014            JESUS DO, EDWARD K                         596.51         
   OVERACTIVE BLADDER                     

 

 2014            AMBER SOLISJANEL TUCKER R                         596.51   
         OVERACTIVE BLADDER                     

 

 2014            EMERSON AYERS, JONATHAN SELF            Ot            682.6      
      CELLULITIS OF LEG                     

 

 2014            EMERSON AYERS, JONATHAN SELF            Ot            891.1      
      OPEN WND KNEE/LEG-COMPL                     

 

 2014            JONATHAN NORMAN MD            Ot            E000.8     
       OTHER EXTERNAL CAUSE STATUS                     

 

 2014            EMERSON AYERS, JONATHAN SELF            Ot            E928.9     
       ACCIDENT NOS                     

 

 2014            ANN MARIE ARELLANOEDWARD K                         599.70         
   HEMATURIA                     

 

 2014            ANN MARIE ARELLANOMINISTERIOA K                         599.70         
   HEMATURIA                     

 

 2014            AMBER SOLISJANEL TUCKER R                         599.70   
         HEMATURIA                     

 

 2015            JANEL CLARK CFALEXI            Ot            780.97  
                                

 

 2015            JANEL CLARK CFALEXI            Ot            780.97  
                                

 

 2015            GAURAV AYERS, HANK WATERS            Ot            261        
                          

 

 2015            GAURAV AYERS, HANK WATERS            Ot            276.51     
                             

 

 2015            GAURAV AYERS, HANK WATERS            Ot            401.9      
                            

 

 2015            GAURAV AYERS, HANK WATERS            Ot            707.01     
                             

 

 2015            GAURAV AYERS, HANK WATERS            Ot            707.03     
                             

 

 2015            GAURAV AYERS, HANK WATERS            Ot            707.09     
                             

 

 2015            GAURAV AYERS, HANK F            Ot            707.25     
                             

 

 2015            GAURAV AYERS, HANK F            Ot            780.97     
                             

 

 2015            GAURAV AYERS, HANK F            Ot            799.3      
                            

 

 2015            GAURAV AYERS, HANK F            Ot            799.52     
                             

 

 2015            GAURAV AYERS, HANK F            Ot            V49.86     
                             

 

 2015            GAURAV AYERS, HANK F            Ot            261        
                          

 

 2015            GAURAV AYERS, HANK F            Ot            276.51     
                             

 

 2015            GAURAV AYERS, HANK F            Ot            401.9      
                            

 

 2015            GAURAV AYERS, HANK F            Ot            707.01     
                             

 

 2015            GAURAV AYERS, HANK F            Ot            707.03     
                             

 

 2015            GAURAV AYERS, HANK F            Ot            707.09     
                             

 

 2015            GAURAV AYERS, HANK F            Ot            707.25     
                             

 

 2015            GAURAV AYERS, HANK F            Ot            780.97     
                             

 

 2015            GAURAV AYERS, HANK F            Ot            799.3      
                            

 

 2015            GAURAV AYERS, HANK F            Ot            799.52     
                             

 

 2015            GAURAV AYERS, HANK F            Ot            V49.86     
                             

 

 2015            GAURAV AYERS, HANK F            Ot            261        
                          

 

 2015            GAURAV AYERS, HANK F            Ot            276.51     
                             

 

 2015            GAURAV AYERS, HANK F            Ot            401.9      
                            

 

 2015            GAURAV AYERS, HANK F            Ot            707.01     
                             

 

 2015            GAURAV AYERS, HANK F            Ot            707.03     
                             

 

 2015            GAURAV AYERS, HANK F            Ot            707.09     
                             

 

 2015            GAURAV AYERS, HANK F            Ot            707.25     
                             

 

 2015            GAURAV AYERS, HANK F            Ot            780.97     
                             

 

 2015            GAURAV AYERS, HANK F            Ot            799.3      
                            

 

 2015            GAURAV AYERS, HANK F            Ot            799.52     
                             

 

 2015            GAURAV AYERS, HANK F            Ot            V49.86     
                             

 

 2015            GAURAV AYERS, HANK F            Ot            261        
    NUTRITIONAL MARASMUS                     

 

 2015            GAURAV AYERS, HANK F            Ot            276.1      
      HYPOSMOLALITY                     

 

 2015            GAURAV AYERS, HANK F            Ot            276.51     
                             

 

 2015            GAURAV AYERS, HANK F            Ot            276.8      
      HYPOPOTASSEMIA                     

 

 2015            GAURAV AYERS, HANK WATERS            Ot            317        
    MILD INTELLECTUAL DISABILITIES                     

 

 2015            GAURAV AYERS, HANK WATERS            Ot            401.9      
      HYPERTENSION NOS                     

 

 2015            GAURAV AYERS, HANK WATERS            Ot            707.01     
       PRESSURE ULCER, ELBOW                     

 

 2015            GAURAV AYERS, HANK WATERS            Ot            707.03     
       PRESSURE ULCER, LOWER BACK                     

 

 2015            GAURAV AYERS, HANK WATERS            Ot            707.09     
       PRESSURE ULCER, OTHER SITE                     

 

 2015            GAURAV AYERS, HANK WATERS            Ot            707.25     
       PRESSURE ULCER, UNSTAGEABLE                     

 

 2015            GAURAV AYERS, HANK WATERS            Ot            780.97     
       ALTERED MENTAL STATUS                     

 

 2015            GAURAV AYERS, HANK WATERS            Ot            799.3      
      DEBILITY NOS                     

 

 2015            GAURAV AYERS, HANK WATERS            Ot            799.52     
       COGNITIVE COMMUNICATION DEFICIT                     

 

 2015            GAURAV AYERS, HANK WATERS            Ot            V49.86     
       DO NOT RESUSCITATE STATUS                     

 

 2015            KALANI AYERS, JANEL AHN            Ot            682.6      
                            

 

 2015            KALANI AYERS, JANEL AHN            Ot            891.1      
                            

 

 2015            KALANI AYERS, JANEL AHN            Ot            E000.8     
                             

 

 2015            KALANI AYERS, JANEL AHN            Ot            E928.9     
                             

 

 2015            GAURAV AYERS, HANK WATERS            Ot            276.51     
       DEHYDRATION                     

 

 2015            GAURAV AYERS, HANK WATERS            Ot            276.7      
      HYPERPOTASSEMIA                     

 

 2015            GAURAV AYERS, HANK WATERS            Ot            317        
    MILD INTELLECTUAL DISABILITIES                     

 

 2015            GAURAV AYERS, HANK WATERS            Ot            331.5      
      IDIOPATHIC NORMAL PRESSURE HYDROCEPHALUS                     

 

 2015            GAURAV AYERS, HANK WATERS            Ot            401.9      
      HYPERTENSION NOS                     

 

 2015            GAURAV AYERS, HANK WATERS            Ot            518.0      
      PULMONARY COLLAPSE                     

 

 2015            GAURAV AYERS, HANK WATERS            Ot            550.90     
       UNILAT INGUINAL HERNIA                     

 

 2015            GAURAV AYERS, HANK WATERS            Ot            560.32     
       FECAL IMPACTION                     

 

 2015            GAURAV AYERS, HANK WATERS            Ot            707.01     
       PRESSURE ULCER, ELBOW                     

 

 2015            GAURAV AYERS, HANK WATERS            Ot            707.03     
       PRESSURE ULCER, LOWER BACK                     

 

 2015            GAURAV AYERS, HANK WATERS            Ot            707.23     
       PRESSURE ULCER, STAGE III                     

 

 2015            HANK NOLASCO MD            Ot            718.40     
       JT CONTRACTURE-UNSPEC                     

 

 2015            HANK NOLASCO MD            Ot            790.4      
      ELEV TRANSAMINASE/LDH                     

 

 2015            HANK NOLASCO MD            Ot            V49.86     
       DO NOT RESUSCITATE STATUS                     

 

 2015            JANEL URIARTE MD            Ot            682.6      
                            

 

 2015            JANEL URIARTE MD            Ot            891.1      
                            

 

 2015            JANEL URIARTE MD            Ot            E000.8     
                             

 

 2015            JANEL URIARTE MD            Ot            E928.9     
                             

 

 2015            AMBER JANEL FINCH            Ot            780.97  
                                

 

 2015            JANEL URIARTE MD            Ot            707.01     
       PRESSURE ULCER, ELBOW                     

 

 2015            KALANI AYERS, JANEL AHN            Ot            707.03     
       PRESSURE ULCER, LOWER BACK                     

 

 2015            JANEL URIARTE MD            Ot            707.23     
       PRESSURE ULCER, STAGE III                     

 

 2015            JANEL URIARTE MD            Ot            707.24     
       PRESSURE ULCER, STAGE IV                     

 

 2015            KALANI AYERS, JANEL AHN            Ot            799.3      
      DEBILITY NOS                     

 

 10/14/2015            KALANI AYERS, JANEL AHN            Ot            707.01     
                             

 

 10/14/2015            KALANI AYERS, JANEL AHN            Ot            707.03     
                             

 

 10/14/2015            KALANI AYERS, JANEL AHN            Ot            707.23     
                             

 

 10/14/2015            KALANI AYERS, JANEL AHN            Ot            707.24     
                             

 

 10/14/2015            KALANI AYERS, JANEL AHN            Ot            799.3      
                            

 

 10/15/2015            KALANI AYERS, JANEL AHN            Ot            707.01     
                             

 

 10/15/2015            KALANI AYERS, JANEL AHN            Ot            707.03     
                             

 

 10/15/2015            KALANI AYERS, JANEL AHN            Ot            707.23     
                             

 

 10/15/2015            KALANI AYERS, JANEL AHN            Ot            707.24     
                             

 

 10/15/2015            JANEL URIARTE MD            Ot            799.3      
                            

 

 2015            KALANI AYERS, JANEL AHN            Ot            L89.024    
        PRESSURE ULCER OF LEFT ELBOW, STAGE 4                     

 

 2015            KALANI AYERS, JANEL AHN            Ot            L89.153    
        PRESSURE ULCER OF SACRAL REGION, STAGE 3                     

 

 2015            KALANI AYERS, JANEL AHN            Ot            R54        
    AGE-RELATED PHYSICAL DEBILITY                     

 

 2016            GEOVANNI AYERS, RAÚL MAHARAJ            Ot            F03.90      
      UNSPECIFIED DEMENTIA WITHOUT BEHAVIORAL                      

 

 2016            RAÚL GALARZA MD            Ot            I10         
   ESSENTIAL (PRIMARY) HYPERTENSION                     

 

 2016            RAÚL GALARZA MD            Ot            R11.10      
      VOMITING, UNSPECIFIED                     

 

 2016            RAÚL GALARZA MD            Ot            Z87.820     
       PERSONAL HISTORY OF TRAUMATIC BRAIN INJU                     

 

 10/13/2017            BEBO CALLAHAN MD            Ot            E86.0   
         DEHYDRATION                     

 

 10/13/2017            BEBO CALLAHAN MD            Ot            I10     
       ESSENTIAL (PRIMARY) HYPERTENSION                     

 

 10/13/2017            BEBO CALLAHAN MD            Ot            K56.41  
          FECAL IMPACTION                     

 

 10/13/2017            BEBO CALLAHAN MD            Ot            R11.10  
          VOMITING, UNSPECIFIED                     

 

 10/13/2017            BEBO CALLAHAN MD            Ot            Z80.0   
         FAMILY HISTORY OF MALIGNANT NEOPLASM OF                      

 

 10/13/2017            BEBO CALLAHAN MD            Ot            Z87.820 
           PERSONAL HISTORY OF TRAUMATIC BRAIN INJU                     

 

 10/13/2017            BEBO CALLAHAN MD            Ot            Z90.89  
          ACQUIRED ABSENCE OF OTHER ORGANS                     

 

 10/16/2017            BEBO CALLAHAN MD            Ot            E86.0   
         DEHYDRATION                     

 

 10/16/2017            BEBO CALLAHAN MD, Ot            I10     
       ESSENTIAL (PRIMARY) HYPERTENSION                     

 

 10/16/2017            BEBO CALLAHAN MD            Ot            K56.41  
          FECAL IMPACTION                     

 

 10/16/2017            BEBO CALLAHAN MD, Ot            R11.10  
          VOMITING, UNSPECIFIED                     

 

 10/16/2017            BEBO CALLAHAN MD            Ot            Z80.0   
         FAMILY HISTORY OF MALIGNANT NEOPLASM OF                      

 

 10/16/2017            BEBO CALLAHAN MD            Ot            Z87.820 
           PERSONAL HISTORY OF TRAUMATIC BRAIN INJU                     

 

 10/16/2017            BEBO CALLAHAN MD            Ot            Z90.89  
          ACQUIRED ABSENCE OF OTHER ORGANS                     

 

 2019            JANEL CLARK CFALEXI            Ot            780.97  
          ALTERED MENTAL STATUS                     

 

 2019            HARINI LEPE MD            Ot            D72.829      
      ELEVATED WHITE BLOOD CELL COUNT, UNSPECI                     

 

 2019            HARINI LEPE MD            Ot            F81.9        
    DEVELOPMENTAL DISORDER OF SCHOLASTIC SKI                     

 

 2019            HARINI LEPE MD            Ot            I10          
  ESSENTIAL (PRIMARY) HYPERTENSION                     

 

 2019            HARINI LEPE MD            Ot            K56.41       
     FECAL IMPACTION                     

 

 2019            HARINI LEPE MD            Ot            K56.7        
    ILEUS, UNSPECIFIED                     

 

 2019            HARINI LEPE MD, Ot            M24.50       
     CONTRACTURE, UNSPECIFIED JOINT                     

 

 2019            HARINI LEPE MD, Ot            M81.0        
    AGE-RELATED OSTEOPOROSIS W/O CURRENT PAT                     

 

 2019            HARINI LEPE MD, Ot            R11.2        
    NAUSEA WITH VOMITING, UNSPECIFIED                     

 

 2019            HARINI LEPE MD, Ot            Z66          
  DO NOT RESUSCITATE                     

 

 2019            HARINI LEPE MD, Ot            Z86.73       
     PRSNL HX OF TIA (TIA), AND CEREB INFRC W                     

 

 2019            HARINI LEPE MD, Ot            Z87.820      
      PERSONAL HISTORY OF TRAUMATIC [facility]                     



                                                                               
                                                                               
                                                                               
                                                                               
                                                              



Procedures

      





 Code            Description            Performed By            Performed On   
     

 

             [id number]                                  [facility] LONG DIP                
                   2013        

 

             [id number]                                  UA LONG DIP                
                   2014        

 

             [id number]                                  UA W/MICROSCOPY            
                       2014        

 

             [id number]                                  UA LONG DIP                
                   2014        

 

             [id number]                                  UA W/MICROSCOPY            
                       2014        



                          



Results

      





 Test            Result            Range        









 Complete blood count (CBC) with automated white blood cell (WBC) differential 
- 10/13/17 03:42         









 Blood leukocytes automated count (number/volume)            15.6 10*3/uL      
      4.3-11.0        

 

 Blood erythrocytes automated count (number/volume)            4.86 10*6/uL    
        4.35-5.85        

 

 Venous blood hemoglobin measurement (mass/volume)            14.7 g/dL        
    13.3-17.7        

 

 Blood hematocrit (volume fraction)            44 %            40-54        

 

 Automated erythrocyte mean corpuscular volume            90 [foz_us]          
  80-99        

 

 Automated erythrocyte mean corpuscular hemoglobin (mass per erythrocyte)      
      30 pg            25-34        

 

 Automated erythrocyte mean corpuscular hemoglobin concentration measurement (
mass/volume)            34 g/dL            32-36        

 

 Automated erythrocyte distribution width ratio            14.3 %            
10.0-14.5        

 

 Automated blood platelet count (count/volume)            383 10*3/uL          
  130-400        

 

 Automated blood platelet mean volume measurement            9.9 [foz_us]      
      7.4-10.4        

 

 Automated blood neutrophils/100 leukocytes            76 %            42-75   
     

 

 Automated blood lymphocytes/100 leukocytes            14 %            12-44   
     

 

 Blood monocytes/100 leukocytes            9 %            0-12        

 

 Automated blood eosinophils/100 leukocytes            1 %            0-10     
   

 

 Automated blood basophils/100 leukocytes            1 %            0-10        

 

 Blood neutrophils automated count (number/volume)            11.8 10*3        
    1.8-7.8        

 

 Blood lymphocytes automated count (number/volume)            2.2 10*3         
   1.0-4.0        

 

 Blood monocytes automated count (number/volume)            1.4 10*3            
0.0-1.0        

 

 Automated eosinophil count            0.1 10*3/uL            0.0-0.3        

 

 Automated blood basophil count (count/volume)            0.1 10*3/uL          
  0.0-0.1        









 PT panel in platelet poor plasma by coagulation assay - 10/13/17 03:42         









 Prothrombin time (PT) in platelet poor plasma by coagulation assay            
13.5 s            12.2-14.7        

 

 INR in platelet poor plasma or blood by coagulation assay            1.0      
       0.8-1.4        









 Activated partial thromboplastin time (aPTT) in platelet poor plasma 
bycoagulation assay - 10/13/17 03:42         









 Activated partial thromboplastin time (aPTT) in platelet poor plasma 
bycoagulation assay            29 s            24-35        









 Blood lactic acid measurement (moles/volume) - 10/13/17 03:42         









 Blood lactic acid measurement (moles/volume)            1.02 mmol/L            
0.50-2.00        









 Blood manual differential performed detection - 10/13/17 03:42         









 Blood monocytes/100 leukocytes            7 %            NRG        

 

 Manual blood segmented neutrophils/100 leukocytes            74 %            
NRG        

 

 Blood band neutrophils/100 leukocytes            0 %            NRG        

 

 Manual blood lymphocytes/100 leukocytes            17 %            NRG        

 

 Manual eosinophils/100 leukocytes in nose            0 %            NRG        

 

 Manual blood basophils/100 leukocytes            0 %            NRG        

 

 Blood lymphocytes variant/100 leukocytes            2 %            NRG        

 

 Blood anisocytosis detection by light microscopy            SLIGHT             
NR        









 Comprehensive metabolic panel - 10/13/17 03:42         









 Serum or plasma sodium measurement (moles/volume)            135 mmol/L       
     135-145        

 

 Serum or plasma potassium measurement (moles/volume)            4.0 mmol/L    
        3.6-5.0        

 

 Serum or plasma chloride measurement (moles/volume)            101 mmol/L     
               

 

 Carbon dioxide            20 mmol/L            21-32        

 

 Serum or plasma anion gap determination (moles/volume)            14 mmol/L   
         5-14        

 

 Serum or plasma urea nitrogen measurement (mass/volume)            16 mg/dL   
         7-18        

 

 Serum or plasma creatinine measurement (mass/volume)            0.76 mg/dL    
        0.60-1.30        

 

 Serum or plasma urea nitrogen/creatinine mass ratio            21             
NRG        

 

 Serum or plasma creatinine measurement with calculation of estimated 
glomerular filtration rate            >             NRG        

 

 Serum or plasma glucose measurement (mass/volume)            123 mg/dL        
            

 

 Serum or plasma calcium measurement (mass/volume)            9.7 mg/dL        
    8.5-10.1        

 

 Serum or plasma total bilirubin measurement (mass/volume)            0.6 mg/dL
            0.1-1.0        

 

 Serum or plasma alkaline phosphatase measurement (enzymatic activity/volume)  
          162 U/L                    

 

 Serum or plasma aspartate aminotransferase measurement (enzymatic activity/
volume)            48 U/L            5-34        

 

 Serum or plasma alanine aminotransferase measurement (enzymatic activity/volume
)            76 U/L            0-55        

 

 Serum or plasma protein measurement (mass/volume)            8.5 g/dL         
   6.4-8.2        

 

 Serum or plasma albumin measurement (mass/volume)            4.2 g/dL         
   3.2-4.5        









 Blood type T Indirect antibody screen panel - 10/13/17 03:42         









 ABO+Rh group            OP             NRG        

 

 Transfusion band number            [id number]             NRG        

 

 Blood group antibody screen            NEGATIVE             NRG        









 Serum or plasma C reactive protein measurement (mass/volume) - 10/13/17 03:42 
        









 Serum or plasma C reactive protein measurement (mass/volume)            1.09 mg
/dL            0.00-0.50        









 Influenza virus A and B antigen detection - 10/13/17 03:54         









 FLU RESULT            NEGATIVE FOR INFLUENZA A AND B ANTIGENS BY IA           
  [facility]        









 Bacterial blood culture - 10/13/17 04:50         









 QUANTITY OF GROWTH            .             NRG        

 

 Bacterial blood culture            SEE COMMEN             NRG        









 Bacterial blood culture - 10/13/17 05:00         









 QUANTITY OF GROWTH            Isolated             NRG        

 

 Bacterial blood culture            [id number]             NRG        









 Complete urinalysis with reflex to culture - 10/13/17 05:42         









 Urine color determination            YELLOW             NRG        

 

 Urine clarity determination            CLEAR             NRG        

 

 Urine pH measurement by test strip            6             5-9        

 

 Specific gravity of urine by test strip            1.020             1.016-
1.022        

 

 Urine protein assay by test strip, semi-quantitative            1+             
NEGATIVE        

 

 Urine glucose detection by automated test strip            NEGATIVE           
  NEGATIVE        

 

 Erythrocytes detection in urine sediment by light microscopy            1+    
         NEGATIVE        

 

 Urine ketones detection by automated test strip            3+             
NEGATIVE        

 

 Urine nitrite detection by test strip            NEGATIVE             NEGATIVE
        

 

 Urine total bilirubin detection by test strip            NEGATIVE             
NEGATIVE        

 

 Urine urobilinogen measurement by automated test strip (mass/volume)          
  NORMAL             NORMAL        

 

 Urine leukocyte esterase detection by dipstick            NEGATIVE             
NEGATIVE        

 

 Automated urine sediment erythrocyte count by microscopy (number/high power 
field)             [HPF]            [facility]        

 

 Automated urine sediment leukocyte count by microscopy (number/high power field
)            NONE             NRG        

 

 Bacteria detection in urine sediment by light microscopy            NEGATIVE  
           NRG        

 

 Squamous epithelial cells detection in urine sediment by light microscopy     
       RARE             NRG        

 

 Crystals detection in urine sediment by light microscopy            NONE      
       NRG        

 

 Casts detection in urine sediment by light microscopy            NONE         
    NRG        

 

 Mucus detection in urine sediment by light microscopy            SMALL        
     NRG        

 

 Complete urinalysis with reflex to culture            NO             NRG      
  









 Complete urinalysis with reflex to culture - 19 18:40         









 Urine color determination            YELLOW             NRG        

 

 Urine clarity determination            CLEAR             NRG        

 

 Urine pH measurement by test strip            6             5-9        

 

 Specific gravity of urine by test strip            1.020             1.016-
1.022        

 

 Urine protein assay by test strip, semi-quantitative            2+             
NEGATIVE        

 

 Urine glucose detection by automated test strip            NEGATIVE           
  NEGATIVE        

 

 Erythrocytes detection in urine sediment by light microscopy            
NEGATIVE             NEGATIVE        

 

 Urine ketones detection by automated test strip            2+             
NEGATIVE        

 

 Urine nitrite detection by test strip            NEGATIVE             NEGATIVE
        

 

 Urine total bilirubin detection by test strip            NEGATIVE             
NEGATIVE        

 

 Urine urobilinogen measurement by automated test strip (mass/volume)          
  NORMAL             NORMAL        

 

 Urine leukocyte esterase detection by dipstick            1+             
NEGATIVE        

 

 Automated urine sediment erythrocyte count by microscopy (number/high power 
field)             [HPF]            NRG        

 

 Automated urine sediment leukocyte count by microscopy (number/high power field
)             [HPF]            NRG        

 

 Bacteria detection in urine sediment by light microscopy            NEGATIVE  
           NRG        

 

 Crystals detection in urine sediment by light microscopy            NONE      
       NRG        

 

 Casts detection in urine sediment by light microscopy            NONE         
    NRG        

 

 Mucus detection in urine sediment by light microscopy            LARGE        
     NRG        

 

 Complete urinalysis with reflex to culture            NO             NRG      
  









 Complete blood count (CBC) with automated white blood cell (WBC) differential 
- 19 18:52         









 Blood leukocytes automated count (number/volume)            20.1 10*3/uL      
      4.3-11.0        

 

 Blood erythrocytes automated count (number/volume)            4.82 10*6/uL    
        4.35-5.85        

 

 Venous blood hemoglobin measurement (mass/volume)            14.7 g/dL        
    13.3-17.7        

 

 Blood hematocrit (volume fraction)            43 %            40-54        

 

 Automated erythrocyte mean corpuscular volume            90 [foz_us]          
  80-99        

 

 Automated erythrocyte mean corpuscular hemoglobin (mass per erythrocyte)      
      31 pg            25-34        

 

 Automated erythrocyte mean corpuscular hemoglobin concentration measurement (
mass/volume)            34 g/dL            32-36        

 

 Automated erythrocyte distribution width ratio            13.5 %            
10.0-14.5        

 

 Automated blood platelet count (count/volume)            378 10*3/uL          
  130-400        

 

 Automated blood platelet mean volume measurement            10.3 [foz_us]     
       7.4-10.4        

 

 Automated blood neutrophils/100 leukocytes            83 %            42-75   
     

 

 Automated blood lymphocytes/100 leukocytes            9 %            12-44    
    

 

 Blood monocytes/100 leukocytes            8 %            0-12        

 

 Automated blood eosinophils/100 leukocytes            0 %            0-10     
   

 

 Automated blood basophils/100 leukocytes            0 %            0-10        

 

 Blood neutrophils automated count (number/volume)            16.6 10*3        
    1.8-7.8        

 

 Blood lymphocytes automated count (number/volume)            1.9 10*3         
   1.0-4.0        

 

 Blood monocytes automated count (number/volume)            1.6 10*3            
0.0-1.0        

 

 Automated eosinophil count            0.1 10*3/uL            0.0-0.3        

 

 Automated blood basophil count (count/volume)            0.1 10*3/uL          
  0.0-0.1        









 Comprehensive metabolic panel - 19 18:52         









 Serum or plasma sodium measurement (moles/volume)            137 mmol/L       
     135-145        

 

 Serum or plasma potassium measurement (moles/volume)            3.7 mmol/L    
        3.6-5.0        

 

 Serum or plasma chloride measurement (moles/volume)            99 mmol/L      
              

 

 Carbon dioxide            24 mmol/L            21-32        

 

 Serum or plasma anion gap determination (moles/volume)            14 mmol/L   
         5-14        

 

 Serum or plasma urea nitrogen measurement (mass/volume)            16 mg/dL   
         7-18        

 

 Serum or plasma creatinine measurement (mass/volume)            0.73 mg/dL    
        0.60-1.30        

 

 Serum or plasma urea nitrogen/creatinine mass ratio            22             
NRG        

 

 Serum or plasma creatinine measurement with calculation of estimated 
glomerular filtration rate            >             NRG        

 

 Serum or plasma glucose measurement (mass/volume)            121 mg/dL        
            

 

 Serum or plasma calcium measurement (mass/volume)            9.4 mg/dL        
    8.5-10.1        

 

 Serum or plasma total bilirubin measurement (mass/volume)            0.6 mg/dL
            0.1-1.0        

 

 Serum or plasma alkaline phosphatase measurement (enzymatic activity/volume)  
          159 U/L                    

 

 Serum or plasma aspartate aminotransferase measurement (enzymatic activity/
volume)            27 U/L            5-34        

 

 Serum or plasma alanine aminotransferase measurement (enzymatic activity/volume
)            21 U/L            0-55        

 

 Serum or plasma protein measurement (mass/volume)            8.2 g/dL         
   6.4-8.2        

 

 Serum or plasma albumin measurement (mass/volume)            4.0 g/dL         
   3.2-4.5        

 

 CALCIUM CORRECTED            9.4 mg/dL            8.5-10.1        









 Serum or plasma amylase measurement (enzymatic activity/volume) - 19 18:
52         









 Serum or plasma amylase measurement (enzymatic activity/volume)            39 U
/L                    









 Lipase - 19 18:52         









 Lipase            < U/L            8-78        









 Blood manual differential performed detection - 19 18:52         









 Blood monocytes/100 leukocytes            2 %            NRG        

 

 Manual blood segmented neutrophils/100 leukocytes            82 %            
NRG        

 

 Blood band neutrophils/100 leukocytes            0 %            NRG        

 

 Manual blood lymphocytes/100 leukocytes            15 %            NRG        

 

 Manual eosinophils/100 leukocytes in nose            0 %            NRG        

 

 Manual blood basophils/100 leukocytes            1 %            NRG        

 

 Blood erythrocyte morphology finding identification            NORMAL         
    NRG        









 Bacterial blood culture - 19 19:30         









 Bacterial blood culture            NG             NRG        









 Blood lactic acid measurement (moles/volume) - 19 19:50         









 Blood lactic acid measurement (moles/volume)            1.97 mmol/L            
0.50-2.00        









 Bacterial blood culture - 19 19:50         









 Bacterial blood culture            NG             NRG        









 Complete blood count (CBC) with automated white blood cell (WBC) differential 
- 19 04:31         









 Blood leukocytes automated count (number/volume)            15.1 10*3/uL      
      4.3-11.0        

 

 Blood erythrocytes automated count (number/volume)            4.41 10*6/uL    
        4.35-5.85        

 

 Venous blood hemoglobin measurement (mass/volume)            13.4 g/dL        
    13.3-17.7        

 

 Blood hematocrit (volume fraction)            40 %            40-54        

 

 Automated erythrocyte mean corpuscular volume            90 [foz_us]          
  80-99        

 

 Automated erythrocyte mean corpuscular hemoglobin (mass per erythrocyte)      
      30 pg            25-34        

 

 Automated erythrocyte mean corpuscular hemoglobin concentration measurement (
mass/volume)            34 g/dL            32-36        

 

 Automated erythrocyte distribution width ratio            13.7 %            
10.0-14.5        

 

 Automated blood platelet count (count/volume)            353 10*3/uL          
  130-400        

 

 Automated blood platelet mean volume measurement            10.1 [foz_us]     
       7.4-10.4        

 

 Automated blood neutrophils/100 leukocytes            69 %            42-75   
     

 

 Automated blood lymphocytes/100 leukocytes            21 %            12-44   
     

 

 Blood monocytes/100 leukocytes            8 %            0-12        

 

 Automated blood eosinophils/100 leukocytes            1 %            0-10     
   

 

 Automated blood basophils/100 leukocytes            0 %            0-10        

 

 Blood neutrophils automated count (number/volume)            10.5 10*3        
    1.8-7.8        

 

 Blood lymphocytes automated count (number/volume)            3.2 10*3         
   1.0-4.0        

 

 Blood monocytes automated count (number/volume)            1.3 10*3            
0.0-1.0        

 

 Automated eosinophil count            0.2 10*3/uL            0.0-0.3        

 

 Automated blood basophil count (count/volume)            0.1 10*3/uL          
  0.0-0.1        









 Comprehensive metabolic panel - 19 04:31         









 Serum or plasma sodium measurement (moles/volume)            135 mmol/L       
     135-145        

 

 Serum or plasma potassium measurement (moles/volume)            3.9 mmol/L    
        3.6-5.0        

 

 Serum or plasma chloride measurement (moles/volume)            100 mmol/L     
               

 

 Carbon dioxide            26 mmol/L            21-32        

 

 Serum or plasma anion gap determination (moles/volume)            9 mmol/L    
        5-14        

 

 Serum or plasma urea nitrogen measurement (mass/volume)            15 mg/dL   
         7-18        

 

 Serum or plasma creatinine measurement (mass/volume)            0.76 mg/dL    
        0.60-1.30        

 

 Serum or plasma urea nitrogen/creatinine mass ratio            20             
NRG        

 

 Serum or plasma creatinine measurement with calculation of estimated 
glomerular filtration rate            >             NRG        

 

 Serum or plasma glucose measurement (mass/volume)            127 mg/dL        
            

 

 Serum or plasma calcium measurement (mass/volume)            8.7 mg/dL        
    8.5-10.1        

 

 Serum or plasma total bilirubin measurement (mass/volume)            0.5 mg/dL
            0.1-1.0        

 

 Serum or plasma alkaline phosphatase measurement (enzymatic activity/volume)  
          131 U/L                    

 

 Serum or plasma aspartate aminotransferase measurement (enzymatic activity/
volume)            21 U/L            5-34        

 

 Serum or plasma alanine aminotransferase measurement (enzymatic activity/volume
)            19 U/L            0-55        

 

 Serum or plasma protein measurement (mass/volume)            6.9 g/dL         
   6.4-8.2        

 

 Serum or plasma albumin measurement (mass/volume)            3.5 g/dL         
   3.2-4.5        

 

 CALCIUM CORRECTED            9.1 mg/dL            8.5-10.1        









 Complete blood count (CBC) with automated white blood cell (WBC) differential 
- 19 04:08         









 Blood leukocytes automated count (number/volume)            9.9 10*3/uL       
     4.3-11.0        

 

 Blood erythrocytes automated count (number/volume)            4.20 10*6/uL    
        4.35-5.85        

 

 Venous blood hemoglobin measurement (mass/volume)            12.5 g/dL        
    13.3-17.7        

 

 Blood hematocrit (volume fraction)            38 %            40-54        

 

 Automated erythrocyte mean corpuscular volume            91 [foz_us]          
  80-99        

 

 Automated erythrocyte mean corpuscular hemoglobin (mass per erythrocyte)      
      30 pg            25-34        

 

 Automated erythrocyte mean corpuscular hemoglobin concentration measurement (
mass/volume)            33 g/dL            32-36        

 

 Automated erythrocyte distribution width ratio            13.8 %            
10.0-14.5        

 

 Automated blood platelet count (count/volume)            312 10*3/uL          
  130-400        

 

 Automated blood platelet mean volume measurement            10.2 [foz_us]     
       7.4-10.4        

 

 Automated blood neutrophils/100 leukocytes            53 %            42-75   
     

 

 Automated blood lymphocytes/100 leukocytes            31 %            12-44   
     

 

 Blood monocytes/100 leukocytes            10 %            0-12        

 

 Automated blood eosinophils/100 leukocytes            5 %            0-10     
   

 

 Automated blood basophils/100 leukocytes            1 %            0-10        

 

 Blood neutrophils automated count (number/volume)            5.2 10*3         
   1.8-7.8        

 

 Blood lymphocytes automated count (number/volume)            3.0 10*3         
   1.0-4.0        

 

 Blood monocytes automated count (number/volume)            1.0 10*3            
0.0-1.0        

 

 Automated eosinophil count            0.5 10*3/uL            0.0-0.3        

 

 Automated blood basophil count (count/volume)            0.1 10*3/uL          
  0.0-0.1        









 Whole blood basic metabolic panel - 19 04:08         









 Serum or plasma sodium measurement (moles/volume)            132 mmol/L       
     135-145        

 

 Serum or plasma potassium measurement (moles/volume)            4.1 mmol/L    
        3.6-5.0        

 

 Serum or plasma chloride measurement (moles/volume)            100 mmol/L     
               

 

 Carbon dioxide            24 mmol/L            21-32        

 

 Serum or plasma anion gap determination (moles/volume)            8 mmol/L    
        5-14        

 

 Serum or plasma urea nitrogen measurement (mass/volume)            11 mg/dL   
         7-18        

 

 Serum or plasma creatinine measurement (mass/volume)            0.67 mg/dL    
        0.60-1.30        

 

 Serum or plasma urea nitrogen/creatinine mass ratio            16             
NRG        

 

 Serum or plasma creatinine measurement with calculation of estimated 
glomerular filtration rate            >             NRG        

 

 Serum or plasma glucose measurement (mass/volume)            96 mg/dL         
           

 

 Serum or plasma calcium measurement (mass/volume)            8.5 mg/dL        
    8.5-10.1        



                                                                



Encounters

      





 ACCT No.            Visit Date/Time            Discharge            Status    
        Pt. Type            Provider            Facility            Loc./Unit  
          Complaint        

 

 [id number]            2014 11:01:00            2014 23:59:59          
  CLS            Outpatient            JANEL GASPAR                  
                             

 

 [id number]            2014 09:37:00            2014 23:59:59          
  CLS            Outpatient            EDWARD JESUS DO                        
                       

 

 [id number]            2014 10:21:00            2014 23:59:59          
  CLS            Outpatient            EDWARD JESUS DO                        
                       

 

 [id number]            2013 13:34:00            2013 23:59:59          
  CLS            Outpatient            JANEL GASPAR                  
                             

 

 [id number]            2013 13:34:00            2013 23:59:59          
  CLS            Outpatient            EDWARD JESUS DO                        
                       

 

 [id number]            2013 13:26:00            2013 23:59:59          
  CLS            Outpatient            EDWARD JESUS DO                        
                       

 

 [id number]            2019 20:00:00            2019 14:05:00    
        DIS            Outpatient            ROBERTH AYERS, HARINI ALSTON            [address] 
[facility]            4TH            ILEUS,PSEUO-OBSTRUCTION,
MENTAL IMPAIRMENT        

 

 [id number]            10/13/2017 03:21:00            10/13/2017 08:55:00    
        DIS            Emergency            BEBO CALLAHAN MD            
[facility]            ER            UPPER GI BLEED,TEMP,
VOMITING        

 

 [id number]            2016 06:07:00            2016 08:30:00    
        DIS            Emergency            RAÚL GALARZA MD            [address] 
[facility]            ER            VOMITING        

 

 [id number]            2016 22:44:00            2016 02:05:00    
        DIS            Emergency            RAÚL GALARZA MD            [address] 
[facility]            ER            SHAKING        

 

 [id number]            2015 09:33:00            2015 13:52:00    
        DIS            Outpatient            JANEL URIARTE MD            [address] 
[facility]            WOUNDCARE            CELLULITIS RT 
LOWER LEG        

 

 [id number]            2015 14:39:00            2015 00:01:00    
        DIS            Outpatient            JANEL URIARTE MD            [facility] 
[facility]            WOUNDCARE            CELLULITIS RT 
LOWER LEG        

 

 [id number]            2015 18:30:00            2015 15:40:00    
        DIS            Inpatient            HANK NOLASCO MD            [address] 
[address]            BIBASILAR PNEUMONIA 
COLONIC OBSTRCUTION IMPACTION        

 

 [id number]            2015 09:31:00            2015 14:10:00    
        DIS            Inpatient            HANK NOLASCO MD            [address] 
[address]            AMS,DEBILITY        

 

 [id number]            2015 07:12:00            2015 23:59:59    
        CLS            Outpatient            JANEL CLARK CFNP            
[facility]            RAD            MENTAL STATUS 
DECREASED        

 

 [id number]            2014 12:00:00            2014 16:21:00    
        DIS            Outpatient            JONATHAN NORMAN MD            [address] 
[facility]            WOUNDCARE            CELLULITIS RT 
LOWER LEG        

 

 [id number]            2013 23:22:00            2013 12:25:00    
        DIS            Inpatient            EDWARD JESUS DO            [address] 
[address]            UTI;DEHYDRATION;
CELLULITIS AND WOUND INFECTION        

 

 [id number]            2013 21:15:00            2013 15:30:00    
        DIS            Inpatient            JONATHAN NORMAN MD            [address] 
[address]            CELLULITIS,AMS        

 

 [id number]            2015 14:39:55                         [facility]            
Document Registration

## 2019-02-02 NOTE — NUR
[facility]. THEY 
WILL BE CONTACTING THE  AND SENDING THEM THIS WAY AS SOON AS 
POSSIBLE.

## 2019-02-02 NOTE — ED EENT
History of Present Illness


General


Chief Complaint:  Nasal Problems


Stated Complaint:  NOSE BLEED


Source:  EMS, nursing home records


Exam Limitations:  physical impairment (advance inability)


 (BEBO CALLAHAN MD)





History of Present Illness


Date Seen by Provider:  Feb 2, 2019


Time Seen by Provider:  17:08


Initial Comments


Here by EMS from nursing home where he is reported to have nosebleed since 330.

  They have tried Afrin nasal spray and packing and cannot get it stopped.  He 

was reported to have normal blood pressure today but does have history of high 

blood pressure.  EMS had difficulty getting blood pressure due to 4 extremity 

contracture.  On arrival here, he was noted to be hypertensive in the 170s to 

190s over 120s to 130s.  Patient is not actively bleeding currently after 

packing is removed.  No obvious bleeding overall.  Nursing home reported that 

he apparently was get nosebleed under control but then would sneeze and a large 

clot, and it would start all over again.


Timing/Duration:  abrupt, this afternoon


Severity:  moderate


Location:  nose


Prearrival Treatment:  squeezing nostrils, nasal packing


Modifying Factors:  Improves With Rest


Associated Symptoms:  No fever (BEBO CALLAHAN MD)





Allergies and Home Medications


Allergies


Coded Allergies:  


     No Known Drug Allergies (Unverified , 12/9/13)





Home Medications


Acetaminophen 325 Mg Tablet, 650 MG PO Q4H PRN for PAIN-MILD OR TEMPATURE, (

Reported)


Albuterol Sulfate 2.5 Mg/3 Ml Vial.neb, 2.5 MG NEB Q4H PRN for SHORTNESS OF 

BREATH, (Reported)


Guaifenesin 100 Mg/5 Ml Liquid, 5 ML PO Q4H PRN for COUGH/CONGESTION, (Reported)


Ketotifen Fumarate 5 Ml Drops, 1 DROP OU BID, (Reported)


Latanoprost 2.5 Ml Drops, 1 DROP OU HS, (Reported)


Loratadine 10 Mg Tablet, 10 MG PO DAILY PRN for ALLERGIES, (Reported)


Magnesium Hydroxide 400 Mg/5 Ml Oral.susp, 30 ML PO DAILY PRN for CONSTIPATION-

7TH LINE, (Reported)


Metoprolol Tartrate 50 Mg Tablet, 50 MG PO BID, (Reported)


   HOLD IF P<45 OR B/P <80/60 CALL DRYohana IF GREATER THAN 190/100 


Multivits,Stress Formula/Zinc 1 Each Tablet, 1 TAB PO DAILY, (Reported)


Nystatin 15 Gm Powder, TP Q12H PRN for REDNESS/GAULDING, (Reported)


Polyethylene Glycol 3350 17 Gm Powd.pack, 17 GM PO DAILY, (Reported)


Ranitidine HCl 150 Mg Tablet, 150 MG PO BID, (Reported)


Tetrahydrozoline HCl 15 Ml Drops, 2 DROPS OD Q8H PRN for ALLERGIES, (Reported)


Tizanidine HCl 2 Mg Tablet, 2 MG PO DAILY, (Reported)


[Xeroform Pad]  , TOP HS, (Reported)


   APPLY TO RIGHT SHIN 





Patient Home Medication List


Home Medication List Reviewed:  Yes


 (BEBO CALLAHAN MD)





Review of Systems


Review of Systems


Constitutional:  see HPI; No chills, No fever


Nose:  see HPI, clots, epistaxis


Other


Unable to complete review of systems due to her underlying developmental 

disorder and inability to communicate.


 (BEBO CALLAHAN MD)





Past Medical-Social-Family Hx


Past Med/Social Hx:  Reviewed Nursing Past Med/Soc Hx


 (BEBO CALLAHAN MD)





Immunizations Up To Date


Tetanus Booster (TDap):  Less than 5yrs


PED Vaccines UTD:  Yes


Date of Pneumonia Vaccine:  Jul 20, 2013


Date of Influenza Vaccine:  Oct 9, 2017


 (BEBO CALLAHAN MD)





Seasonal Allergies


Seasonal Allergies:  No


 (BEBO CALLAHAN MD)





Past Medical History


Surgeries:  Yes


Abdominal, Tonsillectomy


Respiratory:  No


Currently Using CPAP:  No


Currently Using BIPAP:  No


Cardiac:  Yes (venous insufficiency)


Hypertension, Peripheral Vascular


Neurological:  Yes


Developmental Disorder, TIA, Traumatic Brain Injury


Reproductive Disorders:  No


Sexually Transmitted Disease:  No


HIV/AIDS:  No


Genitourinary:  Yes


UTI-Chronic


Gastrointestinal:  Yes (ILEUS; IMPACTIONS)


Obstructive Bowel, Chronic Constipation


Musculoskeletal:  Yes (? NON-AMBULATORY??; CONTRACTURES OF ARMS AND HANDS)


Osteoporosis, Arthritis, Contracture


Endocrine:  No


HEENT:  No


Cancer:  No


Psychosocial:  Yes (DEVELOPMENTAL DISORDER; COGNITIVE IMPAIRMENT)


Eating Disorder


Integumentary:  Yes


Recent Skin Changes


Blood Disorders:  No


Adverse Reaction/Blood Tranf:  No


 (BEBO CALLAHAN MD)





Family Medical History





Cancer


  09 SISTER (BREAST CANCER)


  MATERAL GRANDMOTHER (BREAST CANCER)


  GREAT AUNT (BREAST CANCER)


  MATERNAL UNCLE  (PANCREATIC CANCER)


  MATERNAL GRANDFATHER (TESTICULAR/PROSTATE CANCER)


Family history: Cardiovascular disease


  09 SISTER


  09 SISTER (SMALL HEART ATTACK, SMALL STROKE )


Family history: Diabetes mellitus


  09 SISTER


  03 MOTHER


  09 SISTER


Family history: Hypertension


  09 SISTER


  03 FATHER


  09 SISTER


  09 SISTER


Family history: Thyroid disorder


  09 SISTER


History of - respiratory disease


  09 SISTER (COPD)


  09 SISTER (COPD/ASTHMA)


  09 SISTER (COPD /ASTHMA)


Myocardial infarction


Prostate cancer





No Family History of:


  Abdominal aortic aneurysm


  Rom's disease


  Alcoholism


  Aphasia


  Cancer of colon


  Cataract


  Chest pain


  Congenital heart disease


  Congestive heart failure


  Cystic fibrosis


  Dementia


  Dysphagia


  Family history: Allergy


  Family history: Alzheimer's disease


  Family history: Arthritis


  Family history: Asthma


  Family history: Breast disease


  Family history: Coronary thrombosis


  Family history: Gastrointestinal disease


  Family history: Glaucoma


  Family history: Osteoporosis


  Headache


  Hearing loss


  Heart disease


  Hereditary disease


  History of - anemia


  History of - disorder


  History of drug abuse


  Human immunodeficiency virus (HIV) seropositivity


  Hypercholesterolemia


  Infertile


  Kidney disease


  Malignant neoplasm of lung


  Parkinson's disease


  Psychotic disorder


  Seizure disorder


  Stroke


  Tuberculosis


  Visual impairment





History per records review as patient is unable to provide information.


 (BEBO CALLAHAN MD)





Physical Exam


Vital Signs





Vital Signs - First Documented








 2/2/19





 17:21


 


Temp 96.2


 


Pulse 93


 


Resp 14


 


B/P (MAP) 193/107 (135)


 


Pulse Ox 97





 (JEFFERY RIVAS)


Height, Weight, BMI


Height: 5'4.00"


Weight: 193lbs. 12.8oz. 87.469681uj; 33.2 BMI


Method:Estimated


General Appearance:  WD/WN, no apparent distress, other (contracture 4 

extremities)


Eyes:  bilateral eye normal inspection, bilateral eye PERRL, bilateral eye EOMI


Nose:  dried blood, other (old blood in the left near the right naris appears 

clear.)


Mouth/Throat:  pharynx normal, other (no obvious blood in the posterior pharynx)


Cardiovascular:  no murmur, tachycardia


Respiratory:  lungs clear, normal breath sounds


Gastrointestinal:  non tender, soft


Neurologic/Psychiatric:  other (awake and follows simple commands by opening 

mouth and verbalizing AH)


Skin:  normal color, warm/dry (BEBO CALLAHAN MD)





Progress/Results/Core Measures


Results/Orders


Lab Results





Laboratory Tests








Test


 2/2/19


17:35 Range/Units


 


 


White Blood Count


 15.0 H


 4.3-11.0


10^3/uL


 


Red Blood Count


 4.41 


 4.35-5.85


10^6/uL


 


Hemoglobin 13.3  13.3-17.7  G/DL


 


Hematocrit 40  40-54  %


 


Mean Corpuscular Volume 91  80-99  FL


 


Mean Corpuscular Hemoglobin 30  25-34  PG


 


Mean Corpuscular Hemoglobin


Concent 33 


 32-36  G/DL





 


Red Cell Distribution Width 13.8  10.0-14.5  %


 


Platelet Count


 361 


 130-400


10^3/uL


 


Mean Platelet Volume 9.8  7.4-10.4  FL


 


Prothrombin Time 13.5  12.2-14.7  SEC


 


INR Comment 1.0  0.8-1.4  


 


Activated Partial


Thromboplast Time 28 


 24-35  SEC





 


Sodium Level 135  135-145  MMOL/L


 


Potassium Level 4.2  3.6-5.0  MMOL/L


 


Chloride Level 103    MMOL/L


 


Carbon Dioxide Level 21  21-32  MMOL/L


 


Anion Gap 11  5-14  MMOL/L


 


Blood Urea Nitrogen 19 H 7-18  MG/DL


 


Creatinine


 0.70 


 0.60-1.30


MG/DL


 


Estimat Glomerular Filtration


Rate > 60 


  





 


BUN/Creatinine Ratio 27   


 


Glucose Level 127 H   MG/DL


 


Calcium Level 9.1  8.5-10.1  MG/DL


 


Corrected Calcium 9.1  8.5-10.1  MG/DL


 


Total Bilirubin 0.3  0.1-1.0  MG/DL


 


Aspartate Amino Transf


(AST/SGOT) 26 


 5-34  U/L





 


Alanine Aminotransferase


(ALT/SGPT) 23 


 0-55  U/L





 


Alkaline Phosphatase 144 H   U/L


 


Total Protein 7.7  6.4-8.2  GM/DL


 


Albumin 4.0  3.2-4.5  GM/DL





 (JEFFERY RIVAS APRGARRETT)


Medications Given in ED





Current Medications








 Medications  Dose


 Ordered  Sig/Andrea


 Route  Start Time


 Stop Time Status Last Admin


Dose Admin


 


 Hydralazine HCl  10 mg  ONCE  ONCE


 IV  2/2/19 17:30


 2/2/19 17:31 DC 2/2/19 17:37


10 MG


 


 Metoprolol


 Tartrate  5 mg  ONCE  ONCE


 IV  2/2/19 17:30


 2/2/19 17:31 DC 2/2/19 17:37


5 MG





 (JEFFERY RIVAS)


Vital Signs/I&O











 2/2/19 2/2/19





 17:21 19:51


 


Temp 96.2 96.2


 


Pulse 93 93


 


Resp 14 14


 


B/P (MAP) 193/107 (135) 120/95 (103)


 


Pulse Ox 97 97





 (JEFFERY RIVAS)





Progress


Progress Note :  


Progress Note


Seen and evaluated.  Blood pressure noted to be elevated.  IV and labs ordered.

  Nose is not currently bleeding.  Hydralazine 10 mg IV and Lopressor 5 mg IV 

ordered.  We did evaluate blood pressure on multiple extremities due to 

contractures and found consistent blood pressures in the right lower extremity 

on the calf.  We will use this for judgment and guidance.  Monitor patient.


 (BEBO CALLAHAN MD)





Departure


Communication (Admissions)


1808-there is no blood in the oropharynx. Blood pressure has reduced. We will 

transfer back to the nursing home. We've had 3 blood pressure readings varying 

from 120s/90s to 130s/90s.


 (JEFFERY RIVAS)





Impression





 Primary Impression:  


 Hypertension


 Additional Impression:  


 Epistaxis


Disposition:  01 HOME, SELF-CARE


Condition:  Stable





Departure-Patient Inst.


Decision time for Depature:  18:09


 (JEFFERY RIVAS)


Referrals:  


HANK NOLASCO MD (PCP/Family)


Primary Care Physician


Patient Instructions:  High Blood Pressure Emergencies, Nosebleeds (DC)





Add. Discharge Instructions:  


1. Return to ER for any concerns


2. Follow-up with his doctor next week or reevaluation. All discharge 

instructions reviewed with patient and/or family. Voiced understanding.











BEBO CALLAHAN MD Feb 2, 2019 17:36


JEFFERY RIVAS Feb 2, 2019 18:09